# Patient Record
Sex: FEMALE | Race: WHITE | NOT HISPANIC OR LATINO | Employment: OTHER | URBAN - METROPOLITAN AREA
[De-identification: names, ages, dates, MRNs, and addresses within clinical notes are randomized per-mention and may not be internally consistent; named-entity substitution may affect disease eponyms.]

---

## 2023-12-04 ENCOUNTER — OFFICE VISIT (OUTPATIENT)
Dept: ORTHOPEDIC SURGERY | Facility: CLINIC | Age: 73
End: 2023-12-04
Payer: MEDICARE

## 2023-12-04 DIAGNOSIS — M19.049 CMC ARTHRITIS: Primary | ICD-10-CM

## 2023-12-04 PROCEDURE — 20600 DRAIN/INJ JOINT/BURSA W/O US: CPT | Performed by: ORTHOPAEDIC SURGERY

## 2023-12-04 PROCEDURE — 99213 OFFICE O/P EST LOW 20 MIN: CPT | Performed by: ORTHOPAEDIC SURGERY

## 2023-12-04 RX ORDER — LIDOCAINE HYDROCHLORIDE 10 MG/ML
0.5 INJECTION INFILTRATION; PERINEURAL
Status: COMPLETED | OUTPATIENT
Start: 2023-12-04 | End: 2023-12-04

## 2023-12-04 RX ORDER — TRIAMCINOLONE ACETONIDE 40 MG/ML
20 INJECTION, SUSPENSION INTRA-ARTICULAR; INTRAMUSCULAR
Status: COMPLETED | OUTPATIENT
Start: 2023-12-04 | End: 2023-12-04

## 2023-12-04 RX ADMIN — LIDOCAINE HYDROCHLORIDE 0.5 ML: 10 INJECTION INFILTRATION; PERINEURAL at 18:43

## 2023-12-04 RX ADMIN — TRIAMCINOLONE ACETONIDE 20 MG: 40 INJECTION, SUSPENSION INTRA-ARTICULAR; INTRAMUSCULAR at 18:43

## 2023-12-04 NOTE — PROGRESS NOTES
Patient returns to follow-up on her left thumb.  Last seen in early August and provided a thumb CMC joint injection.  She reports that the injection was very helpful for her pain.  She is here for repeat injection to help her get through the holiday season.  She frequently wears her Comfort Cool splint.  She did have a couple days of facial flushing after the injection in August but these went away spontaneously.    Past medical history, medications, allergies, surgical history and review of systems have been reviewed with the patient. Pertinent changes are documented in the HPI. Otherwise they are unchanged when compared to last visit on August 7, 2023.    Physical Examination Findings:  Constitutional: Appears well-developed and well-nourished.  Head: Normocephalic and atraumatic.  Eyes: Pupils are equal and round.  Cardiovascular: Intact distal pulses.   Respiratory: Effort normal. No respiratory distress.  Neurologic: Alert and oriented to person, place, and time.  Skin: Skin is warm and dry.  Hematologic / Lymphatic: No lymphedema, lymphangitis.  Psychiatric: normal mood and affect. Behavior is normal.   Musculoskeletal: Left hand examination with bony prominence at the thumb CMC joint.  Positive CMC grind test.  No MP joint instability.  No thenar atrophy.  Full finger flexion.  Sensation subjectively normal.    Impression: Left thumb CMC arthritis.    Plan: Steroid injection performed today at patient request.    S Inj/Asp: L thumb CMC on 12/4/2023 6:43 PM  Indications: pain  Details: 25 G needle, dorsal approach  Medications: 20 mg triamcinolone acetonide 40 mg/mL; 0.5 mL lidocaine 10 mg/mL (1 %)  Outcome: tolerated well, no immediate complications  Procedure, treatment alternatives, risks and benefits explained, specific risks discussed. Consent was given by the patient. Immediately prior to procedure a time out was called to verify the correct patient, procedure, equipment, support staff and site/side  marked as required. Patient was prepped and draped in the usual sterile fashion.       Return as needed for recurrence or progression of problems .    Trevon Lao MD    [facility] [employer]  Department of Orthopaedic Surgery  Chief of Hand and Upper Extremity Surgery  [facility]    Dictation performed with the use of voice recognition software. Syntax and grammatical errors may exist.

## 2024-04-07 NOTE — PROGRESS NOTES
[facility]  Hand and Upper Extremity Service  Follow up visit         Follow up for: Left thumb CMC arthritis    Interval History: Diagnosed with left thumb CMC arthritis on last visit. Presenting for follow up and observed two bumps on her left flexion tendon sheath. She received injections in December and was doing well up until a couple of weeks ago when she noticed reoccurrence of symptoms. She has also noticed painful bumps on the volar aspect of the wrist. She is wearing Comfort Cool splints everyday and is interested in repeat injections today.             Past medical history, medications, allergies, surgical history and review of systems are reviewed and otherwise unchanged when compared to last visit on 12/4/23.         Examination:  Constitutional: Oriented to person, place, and time.  Appears well-developed and well-nourished.  Head: Normocephalic and atraumatic.  Eyes: Pupils are equal, round, and reactive to light.  Cardiovascular: Intact distal pulses.  Pulmonary/Chest/Breast: Effort normal. No respiratory distress.  Neurological: Alert and oriented to person, place, and time.  Skin: Skin is warm and dry.  Psychiatric: normal mood and affect.  Behavior is normal.  Musculoskeletal: Bony prominence at the thumb CMC joint. Positive CMC grind test but no crepitus or instability. Inflammation along terminal aspect of FCR tendon sheath with what appears to be small ganglion cysts at the level of wrist flexion crease.               Personal Interpretation of Diagnostic studies: No new images obtained              Impression: Left thumb CMC joint arthritis         Plan: Gave her a left thumb CMC joint injection today. She will return in 4 months for possible reevaluation and possible repeat injection.           In Office Procedures Performed: Left thumb CMC joint injection  S Inj/Asp: L thumb CMC on 4/8/2024 4:24 PM  Indications: pain  Details: 25 G needle, dorsal  approach  Medications: 20 mg triamcinolone acetonide 40 mg/mL; 0.5 mL lidocaine 10 mg/mL (1 %)  Outcome: tolerated well, no immediate complications  Procedure, treatment alternatives, risks and benefits explained, specific risks discussed. Consent was given by the patient. Immediately prior to procedure a time out was called to verify the correct patient, procedure, equipment, support staff and site/side marked as required. Patient was prepped and draped in the usual sterile fashion.                Medications Prescribed: None              Follow up: 4 months              Trevon Lao MD  [facility]  Department of Orthopaedic Surgery  Hand and Upper Extremity Reconstruction    Scribe Attestation  By signing my name below, I, Siobhan Henderson , Juvenal   attest that this documentation has been prepared under the direction and in the presence of Dr. Trevon Lao.    Dictation performed with the use of voice recognition software.  Syntax and grammatical errors may exist.

## 2024-04-08 ENCOUNTER — OFFICE VISIT (OUTPATIENT)
Dept: ORTHOPEDIC SURGERY | Facility: CLINIC | Age: 74
End: 2024-04-08
Payer: MEDICARE

## 2024-04-08 VITALS — WEIGHT: 170 LBS | BODY MASS INDEX: 30.12 KG/M2 | HEIGHT: 63 IN

## 2024-04-08 DIAGNOSIS — M19.049 CMC ARTHRITIS: Primary | ICD-10-CM

## 2024-04-08 PROCEDURE — 1125F AMNT PAIN NOTED PAIN PRSNT: CPT | Performed by: ORTHOPAEDIC SURGERY

## 2024-04-08 PROCEDURE — 20600 DRAIN/INJ JOINT/BURSA W/O US: CPT | Performed by: ORTHOPAEDIC SURGERY

## 2024-04-08 PROCEDURE — 1036F TOBACCO NON-USER: CPT | Performed by: ORTHOPAEDIC SURGERY

## 2024-04-08 PROCEDURE — 99213 OFFICE O/P EST LOW 20 MIN: CPT | Performed by: ORTHOPAEDIC SURGERY

## 2024-04-08 RX ORDER — TRIAMCINOLONE ACETONIDE 40 MG/ML
20 INJECTION, SUSPENSION INTRA-ARTICULAR; INTRAMUSCULAR
Status: COMPLETED | OUTPATIENT
Start: 2024-04-08 | End: 2024-04-08

## 2024-04-08 RX ORDER — LIDOCAINE HYDROCHLORIDE 10 MG/ML
0.5 INJECTION INFILTRATION; PERINEURAL
Status: COMPLETED | OUTPATIENT
Start: 2024-04-08 | End: 2024-04-08

## 2024-04-08 RX ADMIN — TRIAMCINOLONE ACETONIDE 20 MG: 40 INJECTION, SUSPENSION INTRA-ARTICULAR; INTRAMUSCULAR at 16:24

## 2024-04-08 RX ADMIN — LIDOCAINE HYDROCHLORIDE 0.5 ML: 10 INJECTION INFILTRATION; PERINEURAL at 16:24

## 2024-04-08 ASSESSMENT — PAIN SCALES - GENERAL: PAINLEVEL_OUTOF10: 4

## 2024-04-08 ASSESSMENT — PAIN - FUNCTIONAL ASSESSMENT: PAIN_FUNCTIONAL_ASSESSMENT: 0-10

## 2024-04-08 ASSESSMENT — PAIN DESCRIPTION - DESCRIPTORS: DESCRIPTORS: ACHING

## 2024-08-05 ENCOUNTER — APPOINTMENT (OUTPATIENT)
Dept: ORTHOPEDIC SURGERY | Facility: CLINIC | Age: 74
End: 2024-08-05
Payer: MEDICARE

## 2024-08-05 VITALS — HEIGHT: 63 IN | WEIGHT: 170 LBS | BODY MASS INDEX: 30.12 KG/M2

## 2024-08-05 DIAGNOSIS — M19.049 CMC ARTHRITIS: Primary | ICD-10-CM

## 2024-08-05 PROCEDURE — 1036F TOBACCO NON-USER: CPT | Performed by: ORTHOPAEDIC SURGERY

## 2024-08-05 PROCEDURE — 20600 DRAIN/INJ JOINT/BURSA W/O US: CPT | Performed by: ORTHOPAEDIC SURGERY

## 2024-08-05 PROCEDURE — 3008F BODY MASS INDEX DOCD: CPT | Performed by: ORTHOPAEDIC SURGERY

## 2024-08-05 PROCEDURE — 99213 OFFICE O/P EST LOW 20 MIN: CPT | Performed by: ORTHOPAEDIC SURGERY

## 2024-08-05 PROCEDURE — 1159F MED LIST DOCD IN RCRD: CPT | Performed by: ORTHOPAEDIC SURGERY

## 2024-08-05 RX ORDER — LIDOCAINE HYDROCHLORIDE 10 MG/ML
0.5 INJECTION INFILTRATION; PERINEURAL
Status: COMPLETED | OUTPATIENT
Start: 2024-08-05 | End: 2024-08-05

## 2024-08-05 RX ORDER — TRIAMCINOLONE ACETONIDE 40 MG/ML
20 INJECTION, SUSPENSION INTRA-ARTICULAR; INTRAMUSCULAR
Status: COMPLETED | OUTPATIENT
Start: 2024-08-05 | End: 2024-08-05

## 2024-08-05 ASSESSMENT — PAIN DESCRIPTION - DESCRIPTORS: DESCRIPTORS: ACHING;SORE

## 2024-08-05 ASSESSMENT — PAIN SCALES - GENERAL: PAINLEVEL_OUTOF10: 5 - MODERATE PAIN

## 2024-08-05 ASSESSMENT — PAIN - FUNCTIONAL ASSESSMENT: PAIN_FUNCTIONAL_ASSESSMENT: 0-10

## 2024-08-05 NOTE — PROGRESS NOTES
Patient returns to follow-up on her left thumb.  We have diagnosed her with CMC arthritis.  She has tried splinting.  She had an injection in early April.  Symptoms started to come back in June.  She presents today to discuss potential surgical options and for repeat injection.    Past medical history, medications, allergies, surgical history and review of systems have been reviewed with the patient. Pertinent changes are documented in the HPI. Otherwise they are unchanged when compared to last visit on April 8, 2024.    Physical Examination Findings:  Constitutional: Appears well-developed and well-nourished.  Head: Normocephalic and atraumatic.  Eyes: Pupils are equal and round.  Cardiovascular: Intact distal pulses.   Respiratory: Effort normal. No respiratory distress.  Neurologic: Alert and oriented to person, place, and time.  Skin: Skin is warm and dry.  Hematologic / Lymphatic: No lymphedema, lymphangitis.  Psychiatric: normal mood and affect. Behavior is normal.   Musculoskeletal: Left hand examination reveals minimal visual changes.  Very positive thumb CMC grind test.  No crepitus or instability at the CMC or MP joint.  No pain over the radial styloid or first dorsal compartment.    Impression: Left thumb CMC joint arthritis.    Plan: We did discuss surgical reconstructive options using her prior x-rays for this purpose of discussion.  She is not ready to consider surgery at this time.  She has requested repeat injections today.    S Inj/Asp: L thumb CMC on 8/5/2024 8:46 AM  Indications: pain  Details: 25 G needle, dorsal approach  Medications: 20 mg triamcinolone acetonide 40 mg/mL; 0.5 mL lidocaine 10 mg/mL (1 %)  Outcome: tolerated well, no immediate complications  Procedure, treatment alternatives, risks and benefits explained, specific risks discussed. Consent was given by the patient. Immediately prior to procedure a time out was called to verify the correct patient, procedure, equipment, support  staff and site/side marked as required. Patient was prepped and draped in the usual sterile fashion.         She will return in 3 months for repeat clinical examination and possible repeat x-rays.  Based on her response to these repeated injections she can determine if she wants to consider surgery or continue with nonoperative management.    Trevon Lao MD    [facility] [employer]  Department of Orthopaedic Surgery  Chief of Hand and Upper Extremity Surgery  [facility]    Dictation performed with the use of voice recognition software. Syntax and grammatical errors may exist.

## 2024-08-12 ENCOUNTER — APPOINTMENT (OUTPATIENT)
Dept: ORTHOPEDIC SURGERY | Facility: CLINIC | Age: 74
End: 2024-08-12
Payer: MEDICARE

## 2024-10-28 ENCOUNTER — OFFICE VISIT (OUTPATIENT)
Dept: URGENT CARE | Age: 74
End: 2024-10-28
Payer: MEDICARE

## 2024-10-28 VITALS
OXYGEN SATURATION: 98 % | TEMPERATURE: 98.6 F | DIASTOLIC BLOOD PRESSURE: 67 MMHG | WEIGHT: 168 LBS | HEART RATE: 73 BPM | HEIGHT: 63 IN | RESPIRATION RATE: 18 BRPM | SYSTOLIC BLOOD PRESSURE: 134 MMHG | BODY MASS INDEX: 29.77 KG/M2

## 2024-10-28 DIAGNOSIS — J40 BRONCHITIS: ICD-10-CM

## 2024-10-28 DIAGNOSIS — J01.00 ACUTE NON-RECURRENT MAXILLARY SINUSITIS: Primary | ICD-10-CM

## 2024-10-28 PROCEDURE — 1036F TOBACCO NON-USER: CPT | Performed by: STUDENT IN AN ORGANIZED HEALTH CARE EDUCATION/TRAINING PROGRAM

## 2024-10-28 PROCEDURE — 1159F MED LIST DOCD IN RCRD: CPT | Performed by: STUDENT IN AN ORGANIZED HEALTH CARE EDUCATION/TRAINING PROGRAM

## 2024-10-28 PROCEDURE — 99203 OFFICE O/P NEW LOW 30 MIN: CPT | Performed by: STUDENT IN AN ORGANIZED HEALTH CARE EDUCATION/TRAINING PROGRAM

## 2024-10-28 PROCEDURE — 3008F BODY MASS INDEX DOCD: CPT | Performed by: STUDENT IN AN ORGANIZED HEALTH CARE EDUCATION/TRAINING PROGRAM

## 2024-10-28 RX ORDER — LEVOTHYROXINE SODIUM 100 UG/1
4000 TABLET ORAL
COMMUNITY

## 2024-10-28 RX ORDER — BENZONATATE 200 MG/1
200 CAPSULE ORAL 3 TIMES DAILY PRN
Qty: 30 CAPSULE | Refills: 0 | Status: SHIPPED | OUTPATIENT
Start: 2024-10-28 | End: 2024-10-28

## 2024-10-28 RX ORDER — DOXYCYCLINE 100 MG/1
100 CAPSULE ORAL 2 TIMES DAILY
Qty: 14 CAPSULE | Refills: 0 | Status: SHIPPED | OUTPATIENT
Start: 2024-10-28 | End: 2024-10-28

## 2024-10-28 RX ORDER — LATANOPROST 50 UG/ML
SOLUTION/ DROPS OPHTHALMIC
COMMUNITY
Start: 2024-10-23

## 2024-10-28 RX ORDER — DOXYCYCLINE 100 MG/1
100 CAPSULE ORAL 2 TIMES DAILY
Qty: 14 CAPSULE | Refills: 0 | Status: SHIPPED | OUTPATIENT
Start: 2024-10-28 | End: 2024-11-04

## 2024-10-28 RX ORDER — BENZONATATE 200 MG/1
200 CAPSULE ORAL 3 TIMES DAILY PRN
Qty: 30 CAPSULE | Refills: 0 | Status: SHIPPED | OUTPATIENT
Start: 2024-10-28 | End: 2024-11-07

## 2024-10-28 RX ORDER — ROSUVASTATIN CALCIUM 40 MG/1
40 TABLET, COATED ORAL NIGHTLY
COMMUNITY

## 2024-10-28 ASSESSMENT — ENCOUNTER SYMPTOMS
COUGH: 1
SINUS PRESSURE: 1

## 2024-11-07 NOTE — PROGRESS NOTES
[facility]  Hand and Upper Extremity Service  Follow up visit         Follow up for: Left thumb     Interval History: She returns for her left thumb. She received a left thumb CMC injection at last visit but she only had 6-7 weeks of relief. She's here for repeat injections but to ultimately discussed more definitive options.               Past medical history, medications, allergies, surgical history and review of systems are reviewed and otherwise unchanged when compared to last visit on 8/5/24         Examination:  Constitutional: Oriented to person, place, and time.  Appears well-developed and well-nourished.  Head: Normocephalic and atraumatic.  Eyes: Pupils are equal, round, and reactive to light.  Cardiovascular: Intact distal pulses.  Pulmonary/Chest/Breast: Effort normal. No respiratory distress.  Neurological: Alert and oriented to person, place, and time.  Skin: Skin is warm and dry.  Psychiatric: normal mood and affect.  Behavior is normal.  Musculoskeletal: Left hand reveals bony prominence and soft tissue swelling of thumb CMC joint. Positive thumb CMC grind test but no MP or CMC instability. No thenar atrophy.       Personal Interpretation of Diagnostic studies: X-rays or left hand taken today demonstrate advanced joint space narrowing of thumb CMC joint but good joint space preservation of STT joint.        Impression: Left thumb CMC joint arthritis       Plan: We've discussed treatment options and she's had several prior injections but seems to get shorter duration of relief. We discussed the benefits of PRP injections and anticipated recovery and outcomes from thumb CMC surgery. She's nervous that she won't be able to function following surgery without the use of her left hand for daily activities and has interest in PRP injections. She received a left thumb CMC injection and made an appointment for her to see Dr. Wagner in early January. She is aware that this  is an out-of-pocket expense.       In Office Procedures Performed: Left thumb CMC injection     S Inj/Asp: L thumb CMC on 11/11/2024 12:29 PM  Indications: pain  Details: 25 G needle, dorsal approach  Medications: 20 mg triamcinolone acetonide 40 mg/mL; 0.5 mL lidocaine 10 mg/mL (1 %)  Outcome: tolerated well, no immediate complications  Procedure, treatment alternatives, risks and benefits explained, specific risks discussed. Consent was given by the patient. Immediately prior to procedure a time out was called to verify the correct patient, procedure, equipment, support staff and site/side marked as required. Patient was prepped and draped in the usual sterile fashion.           Follow up: As needed             Trevon Lao MD  [facility]  Department of Orthopaedic Surgery  Hand and Upper Extremity Reconstruction    Scribe Attestation  By signing my name below, I, Siobhan Henderson , Scribjill   attest that this documentation has been prepared under the direction and in the presence of Dr. Trevon Lao.    Dictation performed with the use of voice recognition software.  Syntax and grammatical errors may exist.

## 2024-11-10 DIAGNOSIS — M19.049 CMC ARTHRITIS: Primary | ICD-10-CM

## 2024-11-11 ENCOUNTER — HOSPITAL ENCOUNTER (OUTPATIENT)
Dept: RADIOLOGY | Facility: CLINIC | Age: 74
Discharge: HOME | End: 2024-11-11
Payer: MEDICARE

## 2024-11-11 ENCOUNTER — APPOINTMENT (OUTPATIENT)
Dept: ORTHOPEDIC SURGERY | Facility: CLINIC | Age: 74
End: 2024-11-11
Payer: MEDICARE

## 2024-11-11 VITALS — HEIGHT: 63 IN | WEIGHT: 168 LBS | BODY MASS INDEX: 29.77 KG/M2

## 2024-11-11 DIAGNOSIS — M19.049 CMC ARTHRITIS: ICD-10-CM

## 2024-11-11 DIAGNOSIS — M19.049 CMC ARTHRITIS: Primary | ICD-10-CM

## 2024-11-11 PROCEDURE — 1125F AMNT PAIN NOTED PAIN PRSNT: CPT | Performed by: ORTHOPAEDIC SURGERY

## 2024-11-11 PROCEDURE — 20600 DRAIN/INJ JOINT/BURSA W/O US: CPT | Performed by: ORTHOPAEDIC SURGERY

## 2024-11-11 PROCEDURE — 1159F MED LIST DOCD IN RCRD: CPT | Performed by: ORTHOPAEDIC SURGERY

## 2024-11-11 PROCEDURE — 3008F BODY MASS INDEX DOCD: CPT | Performed by: ORTHOPAEDIC SURGERY

## 2024-11-11 PROCEDURE — 99213 OFFICE O/P EST LOW 20 MIN: CPT | Performed by: ORTHOPAEDIC SURGERY

## 2024-11-11 PROCEDURE — 1036F TOBACCO NON-USER: CPT | Performed by: ORTHOPAEDIC SURGERY

## 2024-11-11 PROCEDURE — 73130 X-RAY EXAM OF HAND: CPT | Mod: LT

## 2024-11-11 PROCEDURE — 73130 X-RAY EXAM OF HAND: CPT | Mod: LEFT SIDE

## 2024-11-11 RX ORDER — LIDOCAINE HYDROCHLORIDE 10 MG/ML
0.5 INJECTION, SOLUTION INFILTRATION; PERINEURAL
Status: COMPLETED | OUTPATIENT
Start: 2024-11-11 | End: 2024-11-11

## 2024-11-11 RX ORDER — TRIAMCINOLONE ACETONIDE 40 MG/ML
20 INJECTION, SUSPENSION INTRA-ARTICULAR; INTRAMUSCULAR
Status: COMPLETED | OUTPATIENT
Start: 2024-11-11 | End: 2024-11-11

## 2024-11-11 ASSESSMENT — PAIN DESCRIPTION - DESCRIPTORS: DESCRIPTORS: ACHING;SORE

## 2024-11-11 ASSESSMENT — PAIN SCALES - GENERAL: PAINLEVEL_OUTOF10: 4

## 2024-11-11 ASSESSMENT — PAIN - FUNCTIONAL ASSESSMENT: PAIN_FUNCTIONAL_ASSESSMENT: 0-10

## 2024-11-11 NOTE — LETTER
November 11, 2024     Lucille Coates MD  [address]  [facility]  [address]  [facility] [id number]    Patient: Ghislaine Canales   YOB: 1950   Date of Visit: 11/11/2024       Dear Dr. Lucille Coates MD:    Thank you for referring Ghislaine Canales to me for evaluation. Below are my notes for this consultation.  If you have questions, please do not hesitate to call me. I look forward to following your patient along with you.       Sincerely,     Trevon Lao MD      CC: Don Wagner, DO  ______________________________________________________________________________________    [facility]  Hand and Upper Extremity Service  Follow up visit         Follow up for: Left thumb     Interval History: She returns for her left thumb. She received a left thumb CMC injection at last visit but she only had 6-7 weeks of relief. She's here for repeat injections but to ultimately discussed more definitive options.               Past medical history, medications, allergies, surgical history and review of systems are reviewed and otherwise unchanged when compared to last visit on 8/5/24         Examination:  Constitutional: Oriented to person, place, and time.  Appears well-developed and well-nourished.  Head: Normocephalic and atraumatic.  Eyes: Pupils are equal, round, and reactive to light.  Cardiovascular: Intact distal pulses.  Pulmonary/Chest/Breast: Effort normal. No respiratory distress.  Neurological: Alert and oriented to person, place, and time.  Skin: Skin is warm and dry.  Psychiatric: normal mood and affect.  Behavior is normal.  Musculoskeletal: Left hand reveals bony prominence and soft tissue swelling of thumb CMC joint. Positive thumb CMC grind test but no MP or CMC instability. No thenar atrophy.       Personal Interpretation of Diagnostic studies: X-rays or left hand taken today demonstrate advanced joint space narrowing of thumb  CMC joint but good joint space preservation of STT joint.        Impression: Left thumb CMC joint arthritis       Plan: We've discussed treatment options and she's had several prior injections but seems to get shorter duration of relief. We discussed the benefits of PRP injections and anticipated recovery and outcomes from thumb CMC surgery. She's nervous that she won't be able to function following surgery without the use of her left hand for daily activities and has interest in PRP injections. She received a left thumb CMC injection and made an appointment for her to see Dr. Wagner in early January. She is aware that this is an out-of-pocket expense.       In Office Procedures Performed: Left thumb CMC injection     S Inj/Asp: L thumb CMC on 11/11/2024 12:29 PM  Indications: pain  Details: 25 G needle, dorsal approach  Medications: 20 mg triamcinolone acetonide 40 mg/mL; 0.5 mL lidocaine 10 mg/mL (1 %)  Outcome: tolerated well, no immediate complications  Procedure, treatment alternatives, risks and benefits explained, specific risks discussed. Consent was given by the patient. Immediately prior to procedure a time out was called to verify the correct patient, procedure, equipment, support staff and site/side marked as required. Patient was prepped and draped in the usual sterile fashion.           Follow up: As needed             Trevon Lao MD  [facility]  Department of Orthopaedic Surgery  Hand and Upper Extremity Reconstruction    Scribe Attestation  By signing my name below, ISiobhan Scribe   attest that this documentation has been prepared under the direction and in the presence of Dr. Trevon Lao.    Dictation performed with the use of voice recognition software.  Syntax and grammatical errors may exist.

## 2025-01-17 ENCOUNTER — APPOINTMENT (OUTPATIENT)
Dept: ORTHOPEDIC SURGERY | Facility: CLINIC | Age: 75
End: 2025-01-17
Payer: MEDICARE

## 2025-02-27 NOTE — PROGRESS NOTES
[facility]  Hand and Upper Extremity Service  Follow up visit         Follow up for: Left thumb    Interval History: She returns for her left thumb. She received a left thumb CMC injection at last visit and she indicates she's had a lot of pain in the last month. She states she considered PRP injections but her family talked her out of it.               Past medical history, medications, allergies, surgical history and review of systems are reviewed and otherwise unchanged when compared to last visit on 11/11/24         Examination:  Constitutional: Oriented to person, place, and time.  Appears well-developed and well-nourished.  Head: Normocephalic and atraumatic.  Eyes: Pupils are equal, round, and reactive to light.  Cardiovascular: Intact distal pulses.  Pulmonary/Chest/Breast: Effort normal. No respiratory distress.  Neurological: Alert and oriented to person, place, and time.  Skin: Skin is warm and dry.  Psychiatric: normal mood and affect.  Behavior is normal.  Musculoskeletal: Left hand reveals bony prominence at the CMC joint. Markedly positive CMC grind test with about 5 degrees of MP joint hyperextension instability.        Impression: Left thumb CMC joint arthritis       Plan: We again discussed surgical reconstructive options for her but she's not ready to consider that at this point. She received a left thumb CMC injection and will return in 3 months for repeat injection.       In Office Procedures Performed: Left thumb CMC injection     S Inj/Asp: L thumb CMC on 3/3/2025 12:35 PM  Indications: pain  Details: 25 G needle, dorsal approach  Medications: 20 mg triamcinolone acetonide 40 mg/mL; 0.5 mL lidocaine 10 mg/mL (1 %)  Outcome: tolerated well, no immediate complications  Procedure, treatment alternatives, risks and benefits explained, specific risks discussed. Consent was given by the patient. Immediately prior to procedure a time out was called to verify the  correct patient, procedure, equipment, support staff and site/side marked as required. Patient was prepped and draped in the usual sterile fashion.           Follow up: 3 months              Trevon Lao MD  [facility]  Department of Orthopaedic Surgery  Hand and Upper Extremity Reconstruction    Scribe Attestation  By signing my name below, I, Siobhan Juvenal Henderson   attest that this documentation has been prepared under the direction and in the presence of Dr. Trevon Lao.    Dictation performed with the use of voice recognition software.  Syntax and grammatical errors may exist.

## 2025-03-03 ENCOUNTER — APPOINTMENT (OUTPATIENT)
Dept: ORTHOPEDIC SURGERY | Facility: CLINIC | Age: 75
End: 2025-03-03
Payer: MEDICARE

## 2025-03-03 VITALS — BODY MASS INDEX: 29.77 KG/M2 | WEIGHT: 168 LBS | HEIGHT: 63 IN

## 2025-03-03 DIAGNOSIS — M19.049 CMC ARTHRITIS: Primary | ICD-10-CM

## 2025-03-03 PROCEDURE — 3008F BODY MASS INDEX DOCD: CPT | Performed by: ORTHOPAEDIC SURGERY

## 2025-03-03 PROCEDURE — 1036F TOBACCO NON-USER: CPT | Performed by: ORTHOPAEDIC SURGERY

## 2025-03-03 PROCEDURE — 1159F MED LIST DOCD IN RCRD: CPT | Performed by: ORTHOPAEDIC SURGERY

## 2025-03-03 PROCEDURE — 99213 OFFICE O/P EST LOW 20 MIN: CPT | Performed by: ORTHOPAEDIC SURGERY

## 2025-03-03 PROCEDURE — 20600 DRAIN/INJ JOINT/BURSA W/O US: CPT | Performed by: ORTHOPAEDIC SURGERY

## 2025-03-03 RX ORDER — TRIAMCINOLONE ACETONIDE 40 MG/ML
20 INJECTION, SUSPENSION INTRA-ARTICULAR; INTRAMUSCULAR
Status: COMPLETED | OUTPATIENT
Start: 2025-03-03 | End: 2025-03-03

## 2025-03-03 RX ORDER — LIDOCAINE HYDROCHLORIDE 10 MG/ML
0.5 INJECTION, SOLUTION INFILTRATION; PERINEURAL
Status: COMPLETED | OUTPATIENT
Start: 2025-03-03 | End: 2025-03-03

## 2025-03-03 RX ADMIN — TRIAMCINOLONE ACETONIDE 20 MG: 40 INJECTION, SUSPENSION INTRA-ARTICULAR; INTRAMUSCULAR at 12:35

## 2025-03-03 RX ADMIN — LIDOCAINE HYDROCHLORIDE 0.5 ML: 10 INJECTION, SOLUTION INFILTRATION; PERINEURAL at 12:35

## 2025-03-03 ASSESSMENT — PAIN DESCRIPTION - DESCRIPTORS: DESCRIPTORS: ACHING;SORE

## 2025-03-03 ASSESSMENT — PAIN - FUNCTIONAL ASSESSMENT: PAIN_FUNCTIONAL_ASSESSMENT: 0-10

## 2025-03-03 ASSESSMENT — PAIN SCALES - GENERAL: PAINLEVEL_OUTOF10: 5 - MODERATE PAIN

## 2025-06-05 NOTE — PROGRESS NOTES
[facility]  Hand and Upper Extremity Service  Follow up visit         Follow up for: Left thumb     Interval History: She returns for her left thumb. She received a left thumb CMC injection at last visit and gets two and a half months of relief from them. These past two weeks have been very tough for her. She is not ready to discuss or consider surgery but would like a repeat injection.               Past medical history, medications, allergies, surgical history and review of systems are reviewed and otherwise unchanged when compared to last visit on 3/3/25         Examination:  Constitutional: Oriented to person, place, and time.  Appears well-developed and well-nourished.  Head: Normocephalic and atraumatic.  Eyes: Pupils are equal, round, and reactive to light.  Cardiovascular: Intact distal pulses.  Pulmonary/Chest/Breast: Effort normal. No respiratory distress.  Neurological: Alert and oriented to person, place, and time.  Skin: Skin is warm and dry.  Psychiatric: normal mood and affect.  Behavior is normal.  Musculoskeletal: Left hand reveals bony prominence at the base of the thumb. Positive thumb CMC grind test. Stable MP joint.        Impression: Left thumb CMC joint arthritis       Plan: We gave her a left thumb CMC joint injection today and she will follow up in 3 months for repeat injection.       In Office Procedures Performed: Left thumb CMC injection   S Inj/Asp: L thumb CMC on 6/9/2025 12:03 PM  Indications: pain  Details: 25 G needle, dorsal approach  Medications: 20 mg triamcinolone acetonide 40 mg/mL; 0.5 mL lidocaine 10 mg/mL (1 %)  Outcome: tolerated well, no immediate complications  Procedure, treatment alternatives, risks and benefits explained, specific risks discussed. Consent was given by the patient. Immediately prior to procedure a time out was called to verify the correct patient, procedure, equipment, support staff and site/side marked as required.  Patient was prepped and draped in the usual sterile fashion.             Follow up: 3 months             Trevon aLo MD  [facility]  Department of Orthopaedic Surgery  Hand and Upper Extremity Reconstruction    Scribe Attestation  By signing my name below, I, Juvenal Cali   attest that this documentation has been prepared under the direction and in the presence of Dr. Trevon Lao.    Dictation performed with the use of voice recognition software.  Syntax and grammatical errors may exist.

## 2025-06-09 ENCOUNTER — APPOINTMENT (OUTPATIENT)
Dept: ORTHOPEDIC SURGERY | Facility: CLINIC | Age: 75
End: 2025-06-09
Payer: MEDICARE

## 2025-06-09 VITALS — HEIGHT: 63 IN | WEIGHT: 168 LBS | BODY MASS INDEX: 29.77 KG/M2

## 2025-06-09 DIAGNOSIS — M19.049 CMC ARTHRITIS: Primary | ICD-10-CM

## 2025-06-09 PROCEDURE — 99213 OFFICE O/P EST LOW 20 MIN: CPT | Performed by: ORTHOPAEDIC SURGERY

## 2025-06-09 RX ORDER — TRIAMCINOLONE ACETONIDE 40 MG/ML
20 INJECTION, SUSPENSION INTRA-ARTICULAR; INTRAMUSCULAR
Status: COMPLETED | OUTPATIENT
Start: 2025-06-09 | End: 2025-06-09

## 2025-06-09 RX ORDER — LIDOCAINE HYDROCHLORIDE 10 MG/ML
0.5 INJECTION, SOLUTION INFILTRATION; PERINEURAL
Status: COMPLETED | OUTPATIENT
Start: 2025-06-09 | End: 2025-06-09

## 2025-06-09 RX ADMIN — TRIAMCINOLONE ACETONIDE 20 MG: 40 INJECTION, SUSPENSION INTRA-ARTICULAR; INTRAMUSCULAR at 12:03

## 2025-06-09 RX ADMIN — LIDOCAINE HYDROCHLORIDE 0.5 ML: 10 INJECTION, SOLUTION INFILTRATION; PERINEURAL at 12:03

## 2025-09-08 ENCOUNTER — APPOINTMENT (OUTPATIENT)
Dept: ORTHOPEDIC SURGERY | Facility: CLINIC | Age: 75
End: 2025-09-08
Payer: MEDICARE